# Patient Record
Sex: FEMALE
[De-identification: names, ages, dates, MRNs, and addresses within clinical notes are randomized per-mention and may not be internally consistent; named-entity substitution may affect disease eponyms.]

---

## 2018-11-05 ENCOUNTER — TRANSCRIPTION ENCOUNTER (OUTPATIENT)
Age: 23
End: 2018-11-05

## 2021-07-14 ENCOUNTER — APPOINTMENT (OUTPATIENT)
Dept: DISASTER EMERGENCY | Facility: OTHER | Age: 26
End: 2021-07-14

## 2021-09-01 ENCOUNTER — APPOINTMENT (OUTPATIENT)
Dept: DISASTER EMERGENCY | Facility: OTHER | Age: 26
End: 2021-09-01
Payer: COMMERCIAL

## 2021-09-01 PROCEDURE — 0001A: CPT

## 2021-09-22 ENCOUNTER — APPOINTMENT (OUTPATIENT)
Dept: DISASTER EMERGENCY | Facility: OTHER | Age: 26
End: 2021-09-22
Payer: COMMERCIAL

## 2021-09-22 PROCEDURE — 0002A: CPT

## 2025-02-05 ENCOUNTER — EMERGENCY (EMERGENCY)
Facility: HOSPITAL | Age: 30
LOS: 1 days | Discharge: HOME | End: 2025-02-05
Attending: EMERGENCY MEDICINE
Payer: COMMERCIAL

## 2025-02-05 VITALS
HEIGHT: 63 IN | DIASTOLIC BLOOD PRESSURE: 79 MMHG | HEART RATE: 94 BPM | SYSTOLIC BLOOD PRESSURE: 115 MMHG | TEMPERATURE: 99 F | WEIGHT: 179.9 LBS | OXYGEN SATURATION: 96 % | RESPIRATION RATE: 18 BRPM

## 2025-02-05 LAB
ALBUMIN SERPL ELPH-MCNC: 4.5 G/DL — SIGNIFICANT CHANGE UP (ref 3.3–5.2)
ALP SERPL-CCNC: 76 U/L — SIGNIFICANT CHANGE UP (ref 40–120)
ALT FLD-CCNC: 15 U/L — SIGNIFICANT CHANGE UP
ANION GAP SERPL CALC-SCNC: 17 MMOL/L — SIGNIFICANT CHANGE UP (ref 5–17)
AST SERPL-CCNC: 15 U/L — SIGNIFICANT CHANGE UP
BASOPHILS # BLD AUTO: 0.05 K/UL — SIGNIFICANT CHANGE UP (ref 0–0.2)
BASOPHILS NFR BLD AUTO: 0.8 % — SIGNIFICANT CHANGE UP (ref 0–2)
BILIRUB SERPL-MCNC: <0.2 MG/DL — LOW (ref 0.4–2)
BUN SERPL-MCNC: 15.2 MG/DL — SIGNIFICANT CHANGE UP (ref 8–20)
CALCIUM SERPL-MCNC: 9.2 MG/DL — SIGNIFICANT CHANGE UP (ref 8.4–10.5)
CHLORIDE SERPL-SCNC: 102 MMOL/L — SIGNIFICANT CHANGE UP (ref 96–108)
CO2 SERPL-SCNC: 22 MMOL/L — SIGNIFICANT CHANGE UP (ref 22–29)
CREAT SERPL-MCNC: 0.8 MG/DL — SIGNIFICANT CHANGE UP (ref 0.5–1.3)
EGFR: 103 ML/MIN/1.73M2 — SIGNIFICANT CHANGE UP
EOSINOPHIL # BLD AUTO: 0.07 K/UL — SIGNIFICANT CHANGE UP (ref 0–0.5)
EOSINOPHIL NFR BLD AUTO: 1.2 % — SIGNIFICANT CHANGE UP (ref 0–6)
GLUCOSE SERPL-MCNC: 91 MG/DL — SIGNIFICANT CHANGE UP (ref 70–99)
HCG SERPL-ACNC: <4 MIU/ML — SIGNIFICANT CHANGE UP
HCT VFR BLD CALC: 43 % — SIGNIFICANT CHANGE UP (ref 34.5–45)
HGB BLD-MCNC: 14.4 G/DL — SIGNIFICANT CHANGE UP (ref 11.5–15.5)
IMM GRANULOCYTES NFR BLD AUTO: 0.2 % — SIGNIFICANT CHANGE UP (ref 0–0.9)
LYMPHOCYTES # BLD AUTO: 1.87 K/UL — SIGNIFICANT CHANGE UP (ref 1–3.3)
LYMPHOCYTES # BLD AUTO: 31.5 % — SIGNIFICANT CHANGE UP (ref 13–44)
MCHC RBC-ENTMCNC: 29.6 PG — SIGNIFICANT CHANGE UP (ref 27–34)
MCHC RBC-ENTMCNC: 33.5 G/DL — SIGNIFICANT CHANGE UP (ref 32–36)
MCV RBC AUTO: 88.3 FL — SIGNIFICANT CHANGE UP (ref 80–100)
MONOCYTES # BLD AUTO: 0.38 K/UL — SIGNIFICANT CHANGE UP (ref 0–0.9)
MONOCYTES NFR BLD AUTO: 6.4 % — SIGNIFICANT CHANGE UP (ref 2–14)
NEUTROPHILS # BLD AUTO: 3.55 K/UL — SIGNIFICANT CHANGE UP (ref 1.8–7.4)
NEUTROPHILS NFR BLD AUTO: 59.9 % — SIGNIFICANT CHANGE UP (ref 43–77)
NT-PROBNP SERPL-SCNC: <36 PG/ML — SIGNIFICANT CHANGE UP (ref 0–300)
PLATELET # BLD AUTO: 286 K/UL — SIGNIFICANT CHANGE UP (ref 150–400)
POTASSIUM SERPL-MCNC: 4 MMOL/L — SIGNIFICANT CHANGE UP (ref 3.5–5.3)
POTASSIUM SERPL-SCNC: 4 MMOL/L — SIGNIFICANT CHANGE UP (ref 3.5–5.3)
PROT SERPL-MCNC: 7.3 G/DL — SIGNIFICANT CHANGE UP (ref 6.6–8.7)
RBC # BLD: 4.87 M/UL — SIGNIFICANT CHANGE UP (ref 3.8–5.2)
RBC # FLD: 13.4 % — SIGNIFICANT CHANGE UP (ref 10.3–14.5)
SODIUM SERPL-SCNC: 141 MMOL/L — SIGNIFICANT CHANGE UP (ref 135–145)
T3 SERPL-MCNC: 85 NG/DL — SIGNIFICANT CHANGE UP (ref 80–200)
T4 AB SER-ACNC: 7.6 UG/DL — SIGNIFICANT CHANGE UP (ref 4.5–12)
TROPONIN T, HIGH SENSITIVITY RESULT: <6 NG/L — SIGNIFICANT CHANGE UP (ref 0–51)
TSH SERPL-MCNC: 1.1 UIU/ML — SIGNIFICANT CHANGE UP (ref 0.27–4.2)
WBC # BLD: 5.93 K/UL — SIGNIFICANT CHANGE UP (ref 3.8–10.5)
WBC # FLD AUTO: 5.93 K/UL — SIGNIFICANT CHANGE UP (ref 3.8–10.5)

## 2025-02-05 RX ORDER — ACETAMINOPHEN 160 MG/5ML
1000 SUSPENSION ORAL ONCE
Refills: 0 | Status: COMPLETED | OUTPATIENT
Start: 2025-02-05 | End: 2025-02-05

## 2025-02-05 RX ORDER — BACTERIOSTATIC SODIUM CHLORIDE 0.9 %
1000 VIAL (ML) INJECTION ONCE
Refills: 0 | Status: COMPLETED | OUTPATIENT
Start: 2025-02-05 | End: 2025-02-05

## 2025-02-05 RX ADMIN — Medication 1000 MILLILITER(S): at 19:21

## 2025-02-05 RX ADMIN — ACETAMINOPHEN 400 MILLIGRAM(S): 160 SUSPENSION ORAL at 19:20

## 2025-02-05 NOTE — ED ADULT NURSE NOTE - OBJECTIVE STATEMENT
Pt presents to the ED A&Ox4 c/o chest palpitations, SOB and back/abdominal pain since this morning. Pt tried to eat some food but it did not get better. Pt reports she recently returned form [place]. Pt also reports birth control arm implant placed in september. Pt reports PMH of chronic back pain. Pt RR even and unlabored, NAD noted.

## 2025-02-05 NOTE — ED PROVIDER NOTE - PATIENT PORTAL LINK FT
You can access the FollowMyHealth Patient Portal offered by [facility] by registering at the following website: [email or web address]. By joining [facility]’s FollowMyHealth portal, you will also be able to view your health information using other applications (apps) compatible with our system.

## 2025-02-05 NOTE — ED PROVIDER NOTE - PHYSICAL EXAMINATION
Gen: Well appearing in NAD  Head: NC/AT  Neck: trachea midline  Resp:  No distress CTA   CARD RR no pitting LE edema or calf tenderness.   Ext: no deformities  Neuro:  A&O appears non focal  Skin:  Warm and dry as visualized  Psych:  Normal affect and mood

## 2025-02-05 NOTE — ED PROVIDER NOTE - OBJECTIVE STATEMENT
29-year-old female history of chronic neck and lower back pain ligamental injury to the right ankle.  Presented to the emergency department for reports of palpitations, tachycardia into the 110's with associated dizziness shortness of breath right upper quadrant abdominal discomfort and feelings of near syncope.  Reports recent travel to and from [place] and has Nexplanon birth control device to the left upper extremity.  Positive family history of blood clots and CAD.  Patient intermittently smokes cigars no daily smoking.  No EtOH no IV drug use.  No vomiting no diarrhea no URI symptoms.  Reports recent viral illness a few weeks ago.  No medication given or taken prior to arrival to the ED.

## 2025-02-05 NOTE — ED ADULT NURSE NOTE - NSFALLUNIVINTERV_ED_ALL_ED
Bed/Stretcher in lowest position, wheels locked, appropriate side rails in place/Call bell, personal items and telephone in reach/Instruct patient to call for assistance before getting out of bed/chair/stretcher/Non-slip footwear applied when patient is off stretcher/[place] to call system/Physically safe environment - no spills, clutter or unnecessary equipment/Purposeful proactive rounding/Room/bathroom lighting operational, light cord in reach

## 2025-02-05 NOTE — ED PROVIDER NOTE - CLINICAL SUMMARY MEDICAL DECISION MAKING FREE TEXT BOX
29-year-old female history of chronic neck and lower back pain ligamental injury to the right ankle.  Presented to the emergency department for reports of palpitations, tachycardia into the 1 teens with associated dizziness shortness of breath right upper quadrant abdominal discomfort and feelings of near syncope.  Reports recent travel to and from [place] and has Nexplanon birth control device to the left upper extremity.  Positive family history of blood clots and CAD.  Patient intermittently smokes cigars no daily smoking.  No EtOH no IV drug use.  No vomiting no diarrhea no URI symptoms.  Reports recent viral illness a few weeks ago.  No medication given or taken prior to arrival to the ED.  given family hx of Blood clots, recent travel, and BC use pt ordered for CTA r/o PE.   EKG NSR rate 86 Qtc 416 29-year-old female history of chronic neck and lower back pain ligamental injury to the right ankle.  Presented to the emergency department for reports of palpitations, tachycardia into the 1 teens with associated dizziness shortness of breath right upper quadrant abdominal discomfort and feelings of near syncope.  Reports recent travel to and from [place] and has Nexplanon birth control device to the left upper extremity.  Positive family history of blood clots and CAD.  Patient intermittently smokes cigars no daily smoking.  No EtOH no IV drug use.  No vomiting no diarrhea no URI symptoms.  Reports recent viral illness a few weeks ago.  No medication given or taken prior to arrival to the ED.  given family hx of Blood clots, recent travel, and BC use pt ordered for CTA r/o PE.   EKG NSR rate 86 Qtc 416  Pt reassessed, pt feeling better at this time, vss, pt able to walk, talk and vocalized plan of action. Discussed in depth and explained to pt in depth the next steps that need to be taken including proper follow up with PCP or specialists. All incidental findings were discussed with pt as well. Pt verbalized their concerns and all questions were answered. Pt understands dispo and wants discharge. Given good instructions when to return to ED, strict return precautions and importance of f/u.

## 2025-02-05 NOTE — ED ADULT TRIAGE NOTE - CHIEF COMPLAINT QUOTE
patient with palpitations dizziness right upper quadrant abdominal pain and back pain. hx of chronic back pain.

## 2025-02-06 LAB — T4 FREE SERPL-MCNC: 1.4 NG/DL — SIGNIFICANT CHANGE UP (ref 0.9–1.7)

## 2025-02-06 RX ORDER — AZITHROMYCIN DIHYDRATE 500 MG/1
1 TABLET, FILM COATED ORAL
Qty: 4 | Refills: 0
Start: 2025-02-06 | End: 2025-02-09

## 2025-02-06 RX ORDER — AZITHROMYCIN DIHYDRATE 500 MG/1
500 TABLET, FILM COATED ORAL ONCE
Refills: 0 | Status: COMPLETED | OUTPATIENT
Start: 2025-02-06 | End: 2025-02-06

## 2025-02-06 RX ADMIN — AZITHROMYCIN DIHYDRATE 500 MILLIGRAM(S): 500 TABLET, FILM COATED ORAL at 01:05
